# Patient Record
Sex: FEMALE | Race: WHITE | URBAN - METROPOLITAN AREA
[De-identification: names, ages, dates, MRNs, and addresses within clinical notes are randomized per-mention and may not be internally consistent; named-entity substitution may affect disease eponyms.]

---

## 2021-10-10 ENCOUNTER — OFFICE VISIT (OUTPATIENT)
Dept: URGENT CARE | Age: 66
End: 2021-10-10
Payer: MEDICARE

## 2021-10-10 VITALS
TEMPERATURE: 97.6 F | SYSTOLIC BLOOD PRESSURE: 175 MMHG | OXYGEN SATURATION: 97 % | HEIGHT: 66 IN | WEIGHT: 149.2 LBS | BODY MASS INDEX: 23.98 KG/M2 | DIASTOLIC BLOOD PRESSURE: 93 MMHG | HEART RATE: 93 BPM

## 2021-10-10 DIAGNOSIS — S60.032A CONTUSION OF LEFT MIDDLE FINGER WITHOUT DAMAGE TO NAIL, INITIAL ENCOUNTER: Primary | ICD-10-CM

## 2021-10-10 PROCEDURE — 99203 OFFICE O/P NEW LOW 30 MIN: CPT

## 2021-10-10 RX ORDER — RIVAROXABAN 20 MG/1
TABLET, FILM COATED ORAL
COMMUNITY
Start: 2021-10-06

## 2021-10-10 RX ORDER — TRAZODONE HYDROCHLORIDE 100 MG/1
TABLET ORAL
COMMUNITY
Start: 2021-09-17

## 2021-10-10 RX ORDER — ISOSORBIDE MONONITRATE 30 MG/1
TABLET, EXTENDED RELEASE ORAL
COMMUNITY
Start: 2021-10-06

## 2021-10-10 RX ORDER — PANTOPRAZOLE SODIUM 40 MG/1
TABLET, DELAYED RELEASE ORAL
COMMUNITY
Start: 2021-10-05

## 2021-10-10 RX ORDER — POTASSIUM CHLORIDE 750 MG/1
CAPSULE, EXTENDED RELEASE ORAL
COMMUNITY
Start: 2021-10-02

## 2021-10-10 RX ORDER — ATORVASTATIN CALCIUM 80 MG/1
TABLET, FILM COATED ORAL
COMMUNITY
Start: 2021-10-02

## 2021-10-10 RX ORDER — FUROSEMIDE 20 MG/1
TABLET ORAL
COMMUNITY
Start: 2021-10-02

## 2021-10-10 RX ORDER — ESCITALOPRAM OXALATE 10 MG/1
TABLET ORAL
COMMUNITY
Start: 2021-10-07

## 2021-10-10 RX ORDER — HYDROXYZINE 50 MG/1
TABLET, FILM COATED ORAL
COMMUNITY
Start: 2021-09-17

## 2021-10-10 RX ORDER — LISINOPRIL 10 MG/1
TABLET ORAL
COMMUNITY
Start: 2021-10-05

## 2021-10-10 ASSESSMENT — ENCOUNTER SYMPTOMS: SHORTNESS OF BREATH: 0

## 2021-10-10 NOTE — PROGRESS NOTES
Sharon Lyles (: 1955) is a 63 y.o. female here for evaluation of the following chief complaint(s):  Hand Injury (Broken finger on left hand)      SUBJECTIVE:  Pt presents today with c/o possible broken left middle finger. Pt states she fell and injured the finger 2 weeks ago and has been using ice and ibuprofen for relief. Pt states she was going to try and continue to manage it at home, however her daughter encouraged her to come in and be evaluated. Other  Associated symptoms: no chest pain, no fatigue and no shortness of breath        Review of Systems   Constitutional: Negative for activity change and fatigue. Respiratory: Negative for shortness of breath. Cardiovascular: Negative for chest pain and palpitations. Musculoskeletal: Positive for arthralgias and joint swelling. OBJECTIVE:  BP (!) 175/93 (Site: Left Upper Arm, Position: Sitting, Cuff Size: Small Adult)   Pulse 93   Temp 97.6 °F (36.4 °C) (Temporal)   Ht 5' 6\" (1.676 m)   Wt 149 lb 3.2 oz (67.7 kg)   SpO2 97%   BMI 24.08 kg/m²    Physical Exam  Constitutional:       Appearance: Normal appearance. She is normal weight. Cardiovascular:      Rate and Rhythm: Normal rate and regular rhythm. Pulmonary:      Effort: Pulmonary effort is normal.      Breath sounds: Normal breath sounds. Musculoskeletal:      Left hand: Swelling, tenderness and bony tenderness present. Decreased range of motion. Hands:       Comments: PIP of left middle finger is edematous, ecchymotic, and tender to palpation. Pt also has limited ROM of finger and limited sensation at finger tip. Neurological:      Mental Status: She is alert. ASSESSMENT:  1.  Contusion of left middle finger without damage to nail, initial encounter  -     External Referral To Orthopedic Surgery      PLAN:    Pt declined X ray  Referral sent to [facility] per pt request  Finger braced using finger splint and coban with jennifer technique  Ice to

## 2021-10-10 NOTE — PATIENT INSTRUCTIONS
Referral to [facility] sent per your preference  Take OTC pain relievers as needed. Avoid heavy lifting. Use ice for 20-30 minutes every 2 hours. Discussed precautions and indications for returning to clinic. Discussed precautions and indications for seeking ED care.